# Patient Record
Sex: MALE | URBAN - NONMETROPOLITAN AREA
[De-identification: names, ages, dates, MRNs, and addresses within clinical notes are randomized per-mention and may not be internally consistent; named-entity substitution may affect disease eponyms.]

---

## 2023-07-10 RX ORDER — FLUTICASONE PROPIONATE 100 UG/1
POWDER, METERED RESPIRATORY (INHALATION)
COMMUNITY

## 2023-07-10 RX ORDER — ACETAMINOPHEN 160 MG
TABLET,DISINTEGRATING ORAL
COMMUNITY

## 2023-07-10 RX ORDER — VENLAFAXINE 75 MG/1
75 TABLET ORAL 3 TIMES DAILY
COMMUNITY

## 2023-07-10 RX ORDER — ALBUTEROL SULFATE 90 UG/1
2 AEROSOL, METERED RESPIRATORY (INHALATION) EVERY 6 HOURS PRN
COMMUNITY

## 2023-07-10 RX ORDER — LISINOPRIL 20 MG/1
20 TABLET ORAL DAILY
COMMUNITY

## 2023-07-10 RX ORDER — ASPIRIN 325 MG
TABLET, DELAYED RELEASE (ENTERIC COATED) ORAL DAILY
COMMUNITY

## 2023-07-10 RX ORDER — CLOPIDOGREL BISULFATE 75 MG/1
75 TABLET ORAL DAILY
COMMUNITY

## 2023-07-10 RX ORDER — OMEPRAZOLE 20 MG/1
20 CAPSULE, DELAYED RELEASE ORAL DAILY
COMMUNITY

## 2023-07-10 RX ORDER — ATORVASTATIN CALCIUM 40 MG/1
40 TABLET, FILM COATED ORAL DAILY
COMMUNITY

## 2023-07-10 RX ORDER — VERAPAMIL HYDROCHLORIDE 240 MG/1
240 CAPSULE, EXTENDED RELEASE ORAL NIGHTLY
COMMUNITY

## 2023-07-10 RX ORDER — TAMSULOSIN HYDROCHLORIDE 0.4 MG/1
0.4 CAPSULE ORAL DAILY
COMMUNITY

## 2023-07-10 RX ORDER — LORAZEPAM 1 MG/1
1 TABLET ORAL EVERY 6 HOURS PRN
COMMUNITY

## 2023-07-10 RX ORDER — PIOGLITAZONEHYDROCHLORIDE 30 MG/1
30 TABLET ORAL DAILY
COMMUNITY

## 2023-07-10 RX ORDER — NAPROXEN 500 MG/1
500 TABLET ORAL 2 TIMES DAILY WITH MEALS
COMMUNITY
End: 2023-07-11

## 2023-07-11 ENCOUNTER — OFFICE VISIT (OUTPATIENT)
Dept: PULMONOLOGY | Age: 53
End: 2023-07-11
Payer: COMMERCIAL

## 2023-07-11 VITALS
TEMPERATURE: 97.8 F | HEART RATE: 95 BPM | SYSTOLIC BLOOD PRESSURE: 118 MMHG | BODY MASS INDEX: 42.66 KG/M2 | HEIGHT: 72 IN | OXYGEN SATURATION: 98 % | DIASTOLIC BLOOD PRESSURE: 64 MMHG | WEIGHT: 315 LBS

## 2023-07-11 DIAGNOSIS — Z87.891 PERSONAL HISTORY OF TOBACCO USE, PRESENTING HAZARDS TO HEALTH: ICD-10-CM

## 2023-07-11 DIAGNOSIS — J44.9 CHRONIC OBSTRUCTIVE PULMONARY DISEASE, UNSPECIFIED COPD TYPE (HCC): Primary | ICD-10-CM

## 2023-07-11 DIAGNOSIS — R07.2 SUBSTERNAL CHEST PAIN: ICD-10-CM

## 2023-07-11 DIAGNOSIS — E88.01 AAT (ALPHA-1-ANTITRYPSIN) DEFICIENCY (HCC): ICD-10-CM

## 2023-07-11 DIAGNOSIS — R06.09 DYSPNEA ON EXERTION: ICD-10-CM

## 2023-07-11 DIAGNOSIS — G47.30 SLEEP APNEA, UNSPECIFIED TYPE: ICD-10-CM

## 2023-07-11 PROCEDURE — 3017F COLORECTAL CA SCREEN DOC REV: CPT | Performed by: INTERNAL MEDICINE

## 2023-07-11 PROCEDURE — G8428 CUR MEDS NOT DOCUMENT: HCPCS | Performed by: INTERNAL MEDICINE

## 2023-07-11 PROCEDURE — 4004F PT TOBACCO SCREEN RCVD TLK: CPT | Performed by: INTERNAL MEDICINE

## 2023-07-11 PROCEDURE — 99204 OFFICE O/P NEW MOD 45 MIN: CPT | Performed by: INTERNAL MEDICINE

## 2023-07-11 PROCEDURE — 3023F SPIROM DOC REV: CPT | Performed by: INTERNAL MEDICINE

## 2023-07-11 PROCEDURE — G8417 CALC BMI ABV UP PARAM F/U: HCPCS | Performed by: INTERNAL MEDICINE

## 2023-07-11 RX ORDER — PANTOPRAZOLE SODIUM 20 MG/1
20 TABLET, DELAYED RELEASE ORAL DAILY
COMMUNITY

## 2023-07-11 NOTE — PROGRESS NOTES
[place] [facility]  Pulmonary medicine clinic initial consult note. Patient: Sherryle Elk  : 1970      Chief complaint/[place]: Sherryle Elk is a 50 y.o. old male came for further evaluation regarding his COPD and shortness of breath with referral from Julia Cheng MD.    He is having shortness of breath:Yes  Onset: Gradual , when active  Duration:couple minutes. Diurnal variation:  None. Worse  with activity  Functional status prior to beginning of symptoms: Distance he can walk on level ground: 400 feet, as long as he goes at a slow pace  Current functional capacity on level ground: Distance he can walk on level ground: 400 feet. He can climb steps: Yes  Flights of steps he can climb: 1  He gives a history of orthopnea:No  He gives a history of paroxysmal nocturnal dyspnea:No, states he did have difficulty breathing with a CPAP      He is having cough: Yes  Duration of cough: for couple seconds in  duration multiple times a day. His cough is associated with sputum production: Yes, sometimes   The sputum color: clear, sometimes white  Hemoptysis:No  Diurnal variation: None. Worse in the morning  Relieving factors: No  Aggravating factors: No    He is having chest pain:yes, sometimes when he mows his dads lawn  Duration:Days:  Onset:gradual.  Location:predominantly on substernal  Nature/Quality:dull  Assessment:intermittent, with activity. Radiation:nonradiating  Scale:1/10  Relation ship to breathing: no pain with inspiration.     He is currently using any oxygen supplementation at rest, exercise or during sleep/at night time: No    He was ever diagnosed with following pulmonary diseases:  Asthma: Yes   Pulmonary fibrosis:NO  Sarcoidosis:NO  Pulmonary embolism: NO   History of DVT in the past: NO    Pulmonary hypertension:NO  Pleural effusion/s in the past:NO    He ever diagnosed with connective tissue diseases including Systemic

## 2023-07-11 NOTE — PATIENT INSTRUCTIONS
Recommendations/Plan:  -Ordered x-ray of chest PA and lateral views to evaluate lung fields due to history of tobacco smoking, follow-up in 1 month to review results  -Ordered 2D echo to assess cardiac function, correlation with symptoms and history of COPD   -Ordered full PFT with bronchodilator, follow-up in 1 month to review results  -Will send [id number] swab today and to be followed at next clinic visit.   -He was advised to continue to use albuterol HFA 2 puffs every 6 hourly as needed.  -Patient advised to continue current inhalers and keep good compliance with treatment. - Patient educated to update his pneumococcal vaccine with family physician and take influenza vaccine in coming season with out fail. Patient verbalizes understanding.  -Sherryle Elk educated about environmental safety precautions he need to practice to prevent exacerbation ofhis Asthma. Sherryle Elk verbalizes understanding.   -Schedule patient for Cardiology consult with Dr. Ana Smith MD as soon as possible for further evaluation of substernal chest pain with associated exertional dyspnea to evaluate for cardiac etiology of dyspnea and chest pain. - He was educated and demonstrated in my office how to use inhalers.   -Sherryle Elk advised to continue prescribed inhalers and keep good compliance. - Patient educated to update his pneumococcal vaccine with family physician and take influenza vaccine in coming season with out fail.   - Schedule patient for follow up with my clinic in 1 month with the planned chest x-ray PA and lateral views, full PFTs, alpha-1 antitrypsin and echocardiogram report.  He was advised to make early appointment if needed.  -Please schedule patient for follow up at [address] [place] [address] sleep clinic with earliest available provider for further evaluation and management of sleep apnea with his old sleep study reports from [facility].  -I had a discussion with patient regarding other

## 2023-07-11 NOTE — PROGRESS NOTES
Neck Circumference - 19    Mallampati - 4    Lung Nodule Screening     [] Qualifies    [] Does not qualify   [] Declined    [] Completed

## 2023-07-18 NOTE — PROGRESS NOTES
[place] [facility]  Pulmonary medicine clinic follow up note. Patient: Nicolasa Pacheco  : 1970      Chief complaint/[place]: Nicolasa Pacheco is a 50 y.o. old male came for further evaluation regarding his COPD and shortness of breath after having chest x-ray PA and lateral views, full PFTs, alpha-1 antitrypsin and echocardiogram report. He was initially referred from Eliezer Basilio MD.    He was evaluated by Ms. Aletha Reyes CNP at the [address] [place] [address] sleep clinic regarding his sleep apnea on 2023. On today's questioning:  He denies cough with no expectoration. He still having exertional dyspnea  He denies hemoptysis. He denies fever or chills. He denies recent hospitalizations or emergency room visits. He is using his prescribed inhalers with excellent compliance. However, patient did not see any improvement in his shortness of breath with the Flovent therapy  He is using his rescue inhaler too frequently    He denies recent loss of weight or appetite changes. He denies recent decline in functional status. He is currently using following inhalers:  Flovent 100 mcg Diskus 1 INH twice daily  Albuterol HFA 2 puffs every 6 hourly as needed    He is currently using any oxygen supplementation at rest, exercise or during sleep/at night time: No    He ever diagnosed with COVID-19 infection in the past:Yes once, never hospitalized. He denies any history of Glucoma or urinary retention in the past      Social History:  Occupation:  He is current working: No  Type of profession: Previously a , working on getting  disability currently.                      Social History     Tobacco Use    Smoking status: Former     Packs/day: 2.00     Types: Cigarettes     Start date:      Quit date: 7/10/2023     Years since quittin.1    Smokeless tobacco: Never    Tobacco comments:     Quit smoking 4wks ago w/Chantix    Vaping Use    Vaping

## 2023-07-27 DIAGNOSIS — E88.01 AAT (ALPHA-1-ANTITRYPSIN) DEFICIENCY (HCC): ICD-10-CM

## 2023-08-08 ENCOUNTER — TELEPHONE (OUTPATIENT)
Dept: PULMONOLOGY | Age: 53
End: 2023-08-08

## 2023-08-08 NOTE — TELEPHONE ENCOUNTER
Called Mouna zambrano [facility] to notify of approval for echo and that approval letter was faxed.

## 2023-08-08 NOTE — TELEPHONE ENCOUNTER
Received phone call from Mouna at [facility] stating patient completed echo this morning and did not have a prior Mireya Haro approval before testing. Mouna asked if we could start a prior auth for this testing.

## 2023-08-08 NOTE — PROGRESS NOTES
year or past 5 years     Caffeine Intake  Moody Jackson does drink caffeine, usually about  3 cups coffee per day. Recently quit smoking     Employment History  Moody Jackson does not work, currently trying for disability , previously , . Family Sleep History  There are not family members with a history of sleep problems. Sleep Hx     Sleep symptoms:  Yes No  Additional Comments   Snoring [x] []    Witness Apneas [] [x]        Waking snorting/gasping [x] []     Nocturia [x]    []     Legs kicking at night [] [x]     AM Headaches [] [x]     Non-restorative sleep [x] []     Daytime sleepiness/fatigue [x] []     Daytime napping [] [x]     Drowsiness while driving [] [x]     Memory issues [x] []     Decreased concentration [x] []     Cataplexy [] [x]     Hypnogogic hallucinations [] [x]     Sleep paralysis [] [x]     Other [] [x]        Symptoms began with chronic fatigue many years ago      Previous evaluation and treatment has included-PSG/  PSG w/ titration       DOT/CDL - no  FAA/'s license - no     Previous Report(s) Reviewed: historical medical records, office notes and referral letter/letters      Download     PAP Download:   Pt not currently on PAP therapy    Past Medical hx     PMH:  Past Medical History:   Diagnosis Date    Alpha-1-antitrypsin deficiency ([facility])     Asthma     COPD (chronic obstructive pulmonary disease) ([facility])     Diabetes mellitus ([facility])     Hyperlipemia     RADHA (obstructive sleep apnea)      SURGICAL HISTORY:History reviewed. No pertinent surgical history.   SOCIAL HISTORY:  Social History     Tobacco Use    Smoking status: Former     Packs/day: 2.00     Types: Cigarettes     Start date:      Quit date: 7/10/2023     Years since quittin.0    Smokeless tobacco: Never    Tobacco comments:     Quit smoking 4wks ago w/Chantix    Vaping Use    Vaping Use: Never used   Substance Use Topics    Alcohol use: Not Currently    Drug use: Never     ALLERGIES:No Known

## 2023-08-08 NOTE — TELEPHONE ENCOUNTER
Received approval through [employer] for echo at [facility] on 8/8/23 (see media).   Authorization: [id number]  Valid through: 8/8/23-10/7/23

## 2023-08-09 ENCOUNTER — OFFICE VISIT (OUTPATIENT)
Dept: PULMONOLOGY | Age: 53
End: 2023-08-09
Payer: COMMERCIAL

## 2023-08-09 VITALS
WEIGHT: 315 LBS | TEMPERATURE: 97.8 F | DIASTOLIC BLOOD PRESSURE: 60 MMHG | HEIGHT: 72 IN | HEART RATE: 96 BPM | SYSTOLIC BLOOD PRESSURE: 126 MMHG | OXYGEN SATURATION: 97 % | BODY MASS INDEX: 42.66 KG/M2

## 2023-08-09 DIAGNOSIS — I10 ESSENTIAL HYPERTENSION: ICD-10-CM

## 2023-08-09 DIAGNOSIS — G47.30 SLEEP APNEA, UNSPECIFIED TYPE: Primary | ICD-10-CM

## 2023-08-09 DIAGNOSIS — R06.83 LOUD SNORING: ICD-10-CM

## 2023-08-09 DIAGNOSIS — E66.01 OBESITY, CLASS III, BMI 40-49.9 (MORBID OBESITY) (HCC): ICD-10-CM

## 2023-08-09 DIAGNOSIS — K21.9 GERD WITHOUT ESOPHAGITIS: ICD-10-CM

## 2023-08-09 DIAGNOSIS — Z86.73 HISTORY OF STROKE: ICD-10-CM

## 2023-08-09 DIAGNOSIS — R53.82 CHRONIC FATIGUE: ICD-10-CM

## 2023-08-09 PROBLEM — N40.1 BPH ASSOCIATED WITH NOCTURIA: Status: ACTIVE | Noted: 2017-07-20

## 2023-08-09 PROBLEM — R35.1 BPH ASSOCIATED WITH NOCTURIA: Status: ACTIVE | Noted: 2017-07-20

## 2023-08-09 PROBLEM — J45.909 REACTIVE AIRWAY DISEASE: Status: ACTIVE | Noted: 2023-08-09

## 2023-08-09 PROBLEM — F17.210 CIGARETTE NICOTINE DEPENDENCE WITHOUT COMPLICATION: Status: ACTIVE | Noted: 2023-08-09

## 2023-08-09 PROBLEM — E88.01 ALPHA-1-ANTITRYPSIN DEFICIENCY (HCC): Status: ACTIVE | Noted: 2020-06-03

## 2023-08-09 PROBLEM — J30.89 CHRONIC NON-SEASONAL ALLERGIC RHINITIS: Status: ACTIVE | Noted: 2023-08-09

## 2023-08-09 PROCEDURE — 3017F COLORECTAL CA SCREEN DOC REV: CPT | Performed by: NURSE PRACTITIONER

## 2023-08-09 PROCEDURE — G8427 DOCREV CUR MEDS BY ELIG CLIN: HCPCS | Performed by: NURSE PRACTITIONER

## 2023-08-09 PROCEDURE — 1036F TOBACCO NON-USER: CPT | Performed by: NURSE PRACTITIONER

## 2023-08-09 PROCEDURE — 99214 OFFICE O/P EST MOD 30 MIN: CPT | Performed by: NURSE PRACTITIONER

## 2023-08-09 PROCEDURE — 3078F DIAST BP <80 MM HG: CPT | Performed by: NURSE PRACTITIONER

## 2023-08-09 PROCEDURE — G8417 CALC BMI ABV UP PARAM F/U: HCPCS | Performed by: NURSE PRACTITIONER

## 2023-08-09 PROCEDURE — 3074F SYST BP LT 130 MM HG: CPT | Performed by: NURSE PRACTITIONER

## 2023-08-09 ASSESSMENT — ENCOUNTER SYMPTOMS
WHEEZING: 0
EYES NEGATIVE: 1
DIARRHEA: 0
COUGH: 0
VOMITING: 0
ABDOMINAL PAIN: 0
SHORTNESS OF BREATH: 1
NAUSEA: 0

## 2023-08-14 DIAGNOSIS — J44.9 CHRONIC OBSTRUCTIVE PULMONARY DISEASE, UNSPECIFIED COPD TYPE (HCC): ICD-10-CM

## 2023-08-16 DIAGNOSIS — J44.9 CHRONIC OBSTRUCTIVE PULMONARY DISEASE, UNSPECIFIED COPD TYPE (HCC): ICD-10-CM

## 2023-08-16 DIAGNOSIS — G47.30 SLEEP APNEA, UNSPECIFIED TYPE: ICD-10-CM

## 2023-08-17 ENCOUNTER — OFFICE VISIT (OUTPATIENT)
Dept: PULMONOLOGY | Age: 53
End: 2023-08-17
Payer: COMMERCIAL

## 2023-08-17 VITALS
OXYGEN SATURATION: 98 % | HEART RATE: 97 BPM | BODY MASS INDEX: 42.66 KG/M2 | HEIGHT: 72 IN | DIASTOLIC BLOOD PRESSURE: 68 MMHG | SYSTOLIC BLOOD PRESSURE: 122 MMHG | WEIGHT: 315 LBS | TEMPERATURE: 97.7 F

## 2023-08-17 DIAGNOSIS — J44.9 COPD, MILD (HCC): Primary | ICD-10-CM

## 2023-08-17 PROCEDURE — 3017F COLORECTAL CA SCREEN DOC REV: CPT | Performed by: INTERNAL MEDICINE

## 2023-08-17 PROCEDURE — 99214 OFFICE O/P EST MOD 30 MIN: CPT | Performed by: INTERNAL MEDICINE

## 2023-08-17 PROCEDURE — 3078F DIAST BP <80 MM HG: CPT | Performed by: INTERNAL MEDICINE

## 2023-08-17 PROCEDURE — 3074F SYST BP LT 130 MM HG: CPT | Performed by: INTERNAL MEDICINE

## 2023-08-17 PROCEDURE — 1036F TOBACCO NON-USER: CPT | Performed by: INTERNAL MEDICINE

## 2023-08-17 PROCEDURE — G8427 DOCREV CUR MEDS BY ELIG CLIN: HCPCS | Performed by: INTERNAL MEDICINE

## 2023-08-17 PROCEDURE — G8417 CALC BMI ABV UP PARAM F/U: HCPCS | Performed by: INTERNAL MEDICINE

## 2023-08-17 PROCEDURE — 3023F SPIROM DOC REV: CPT | Performed by: INTERNAL MEDICINE

## 2023-08-17 NOTE — PATIENT INSTRUCTIONS
Recommendations/Plan:  -We will discontinue Flovent discus 100 mcg 1 inhalation twice daily due to no improvement in his clinical symptoms.  -Start patient on Stiolto Respimat ( Tiotropium bromide and Olodaterol) 2.5mcg/2.5mcg per actuation 2 puffs once daily. He was detailed about mechanism of action of drug along with associated side effects. He agreed to take the risk and medication. He verbalizes understanding. Patient educated and demonstrated in my office how to use above inhaler. Patient verbalizes understanding.  -He was advised to continue to use albuterol HFA 2 puffs every 6 hourly as needed.  -Patient advised to continue current inhalers and keep good compliance with treatment. - Patient educated to update his pneumococcal vaccine with family physician and take influenza vaccine in coming season with out fail. Patient verbalizes understanding.  -Nicolasa Pacheco educated about environmental safety precautions he need to practice to prevent exacerbation ofhis Asthma. Nicolasa Pacheco verbalizes understanding.   -He was advised to keep his scheduled appointment for cardiology consult with Dr. Joe Gooden MD further evaluation of substernal chest pain, grade 1 diastolic dysfunction noted on his recent echocardiogram with associated exertional dyspnea to evaluate for cardiac etiology of dyspnea and chest pain.  -Will schedule patient for Pulmonary rehab consult as out patient as soon as possible for pulmonary rehab therapy for his COPD once cleared by his cardiologist Dr. Joe Gooden MD  - He was educated and demonstrated in my office how to use inhalers.   -Nicolasa Pacheco advised to continue prescribed inhalers and keep good compliance.  -Nicolasa Pacheco was advised  to keep his scheduled follow up at [address] [place] [address] for pulmonary disease) sleep clinic with Ms. Tierney Martin CNP for further evaluation and management of sleep apnea.   -Schedule patient for follow up with my clinic in 4months for

## 2023-08-22 ENCOUNTER — TELEPHONE (OUTPATIENT)
Dept: CARDIAC REHAB | Age: 53
End: 2023-08-22

## 2023-08-22 NOTE — TELEPHONE ENCOUNTER
PULMONARY REHABILITATION       Received order for Phase II Pulmonary Rehabilitation. Based on insurance qualifications, patient does not qualify for Pulmonary Rehab for COPD based on the FEV1/FVC and FEV1% results on his PFT. Qualifications: FEV1/FVC actual <70 AND FEV1% <80. Roma Stewart 's FEV1/FVC actual is 79 FEV1% is 80      Thank you for your referral to [facility].

## 2023-11-15 NOTE — PROGRESS NOTES
[place] [facility] Pulmonary, Sleep and Critical Care Medicine  Pulmonary medicine clinic follow up note. Patient: Evangelina Billingsley  : 1970      Chief complaint/[place]: Evangelina Billingsley is a 48 y.o. old male came for further evaluation regarding his mild COPD and mild persistent bronchial asthma. He was initially referred from Kylah Darby MD.    He was evaluated by Dr. Luis Daniel Nuñez MD from cardiology service. He was diagnosed with paroxysmal atrial fibrillation. He is currently on treatment with Eliquis 5 mg p.o. twice daily along with flecainide 100 mg p.o. twice daily. He follows with Ms. Royal Alfa CNP regarding his mild obstructive sleep apnea. He is currently not on any treatment. He is interested in going on treatment with inspire device if he still found to have sleep apnea. On today's questioning:  He denies cough or expectoration. He denies hemoptysis. He denies fever or chills. He denies recent hospitalizations or emergency room visits. He is using his prescribed inhalers with good compliance. He is using rescue inhaler/nebs rarely. He denies recent loss of weight or appetite changes. He denies recent decline in functional status. He is currently using following inhalers:  Trelegy 1 puff 4 AM flexion daily  Albuterol HFA 2 puffs every 6 hourly as needed    He is currently using any oxygen supplementation at rest, exercise or during sleep/at night time: No    He ever diagnosed with COVID-19 infection in the past:Yes once, never hospitalized. He denies any history of Glucoma or urinary retention in the past      Social History:  Occupation:  He is current working: No  Type of profession: Previously a , working on getting  disability currently.                      Social History     Tobacco Use    Smoking status: Former     Packs/day: 2     Types: Cigarettes     Start date:      Quit date: 7/10/2023     Years since quittin.4

## 2023-12-14 ENCOUNTER — TELEPHONE (OUTPATIENT)
Dept: PULMONOLOGY | Age: 53
End: 2023-12-14

## 2023-12-14 ENCOUNTER — OFFICE VISIT (OUTPATIENT)
Dept: PULMONOLOGY | Age: 53
End: 2023-12-14
Payer: COMMERCIAL

## 2023-12-14 VITALS
HEART RATE: 89 BPM | BODY MASS INDEX: 42.66 KG/M2 | DIASTOLIC BLOOD PRESSURE: 82 MMHG | SYSTOLIC BLOOD PRESSURE: 130 MMHG | TEMPERATURE: 98.3 F | OXYGEN SATURATION: 97 % | WEIGHT: 315 LBS | HEIGHT: 72 IN

## 2023-12-14 DIAGNOSIS — G47.30 SLEEP APNEA, UNSPECIFIED TYPE: ICD-10-CM

## 2023-12-14 DIAGNOSIS — I10 ESSENTIAL HYPERTENSION: ICD-10-CM

## 2023-12-14 DIAGNOSIS — J44.9 COPD, MILD (HCC): ICD-10-CM

## 2023-12-14 DIAGNOSIS — Z87.891 PERSONAL HISTORY OF TOBACCO USE, PRESENTING HAZARDS TO HEALTH: Primary | ICD-10-CM

## 2023-12-14 DIAGNOSIS — I48.0 PAROXYSMAL A-FIB (HCC): ICD-10-CM

## 2023-12-14 PROCEDURE — 99214 OFFICE O/P EST MOD 30 MIN: CPT | Performed by: INTERNAL MEDICINE

## 2023-12-14 PROCEDURE — 3023F SPIROM DOC REV: CPT | Performed by: INTERNAL MEDICINE

## 2023-12-14 PROCEDURE — 3075F SYST BP GE 130 - 139MM HG: CPT | Performed by: INTERNAL MEDICINE

## 2023-12-14 PROCEDURE — G8427 DOCREV CUR MEDS BY ELIG CLIN: HCPCS | Performed by: INTERNAL MEDICINE

## 2023-12-14 PROCEDURE — G8417 CALC BMI ABV UP PARAM F/U: HCPCS | Performed by: INTERNAL MEDICINE

## 2023-12-14 PROCEDURE — G8484 FLU IMMUNIZE NO ADMIN: HCPCS | Performed by: INTERNAL MEDICINE

## 2023-12-14 PROCEDURE — 1036F TOBACCO NON-USER: CPT | Performed by: INTERNAL MEDICINE

## 2023-12-14 PROCEDURE — 3079F DIAST BP 80-89 MM HG: CPT | Performed by: INTERNAL MEDICINE

## 2023-12-14 PROCEDURE — 3017F COLORECTAL CA SCREEN DOC REV: CPT | Performed by: INTERNAL MEDICINE

## 2023-12-14 RX ORDER — FLECAINIDE ACETATE 100 MG/1
100 TABLET ORAL 2 TIMES DAILY
COMMUNITY

## 2023-12-14 NOTE — TELEPHONE ENCOUNTER
Spoke with Mouna from [employer] and informed her I received approval for CT scheduled next week with them. Informed Mouna I would fax authorization to her.

## 2023-12-14 NOTE — TELEPHONE ENCOUNTER
[facility] prior [facility] lung screen scheduled for 12/22/23 at [address] [place] [address] was approved by [employer] (see media).   Authorization number: [id number]  Valid through: 12/14/23-2/12/24

## 2023-12-14 NOTE — TELEPHONE ENCOUNTER
Morelia from [facility] called and asked if I could start prior authorization for CT lung screen scheduled for 12/22/23 at [facility] that was ordered today (12/14/23) by Dr Rita Hayden.

## 2023-12-14 NOTE — PROGRESS NOTES
Neck Circumference -   18.5  Mallampati - 3    Lung Nodule Screening     [] Qualifies    [] Does not qualify   [] Declined    [] Completed

## 2023-12-14 NOTE — PATIENT INSTRUCTIONS
pulmonary disease) sleep clinic with Ms. Tierney Martin CNP for further evaluation and management of sleep apnea. -Schedule patient for low dose CT scan of chest with out IV contrast as recommended by the  U.S. Preventive Services Task Force and the NCCN for lung Cancer Screening as soon as possible.   -Schedule patient for follow up with my clinic in 1month with recommended test I.e low dose CT chest with out contrast. Patient advised to make early appointment if needed.   -Rosita Armendariz educated about my impression and plan. He verbalizes understanding.

## 2023-12-22 DIAGNOSIS — Z87.891 PERSONAL HISTORY OF TOBACCO USE, PRESENTING HAZARDS TO HEALTH: ICD-10-CM

## 2023-12-22 DIAGNOSIS — J44.9 COPD, MILD (HCC): ICD-10-CM

## 2023-12-27 ENCOUNTER — OFFICE VISIT (OUTPATIENT)
Dept: PULMONOLOGY | Age: 53
End: 2023-12-27
Payer: COMMERCIAL

## 2023-12-27 VITALS
HEART RATE: 93 BPM | SYSTOLIC BLOOD PRESSURE: 124 MMHG | OXYGEN SATURATION: 95 % | DIASTOLIC BLOOD PRESSURE: 74 MMHG | BODY MASS INDEX: 42.66 KG/M2 | WEIGHT: 315 LBS | TEMPERATURE: 97.8 F | HEIGHT: 72 IN

## 2023-12-27 DIAGNOSIS — F17.219 CIGARETTE NICOTINE DEPENDENCE WITH NICOTINE-INDUCED DISORDER: ICD-10-CM

## 2023-12-27 DIAGNOSIS — J44.9 COPD, MILD (HCC): ICD-10-CM

## 2023-12-27 DIAGNOSIS — J44.1 COPD EXACERBATION (HCC): Primary | ICD-10-CM

## 2023-12-27 DIAGNOSIS — E66.01 OBESITY, CLASS III, BMI 40-49.9 (MORBID OBESITY) (HCC): ICD-10-CM

## 2023-12-27 DIAGNOSIS — I48.0 PAROXYSMAL A-FIB (HCC): ICD-10-CM

## 2023-12-27 PROCEDURE — G8427 DOCREV CUR MEDS BY ELIG CLIN: HCPCS | Performed by: NURSE PRACTITIONER

## 2023-12-27 PROCEDURE — 3074F SYST BP LT 130 MM HG: CPT | Performed by: NURSE PRACTITIONER

## 2023-12-27 PROCEDURE — 99214 OFFICE O/P EST MOD 30 MIN: CPT | Performed by: NURSE PRACTITIONER

## 2023-12-27 PROCEDURE — G8482 FLU IMMUNIZE ORDER/ADMIN: HCPCS | Performed by: NURSE PRACTITIONER

## 2023-12-27 PROCEDURE — G0296 VISIT TO DETERM LDCT ELIG: HCPCS | Performed by: NURSE PRACTITIONER

## 2023-12-27 PROCEDURE — 3017F COLORECTAL CA SCREEN DOC REV: CPT | Performed by: NURSE PRACTITIONER

## 2023-12-27 PROCEDURE — G8417 CALC BMI ABV UP PARAM F/U: HCPCS | Performed by: NURSE PRACTITIONER

## 2023-12-27 PROCEDURE — 3023F SPIROM DOC REV: CPT | Performed by: NURSE PRACTITIONER

## 2023-12-27 PROCEDURE — 1036F TOBACCO NON-USER: CPT | Performed by: NURSE PRACTITIONER

## 2023-12-27 PROCEDURE — 3078F DIAST BP <80 MM HG: CPT | Performed by: NURSE PRACTITIONER

## 2023-12-27 RX ORDER — PREDNISONE 20 MG/1
40 TABLET ORAL DAILY
Qty: 10 TABLET | Refills: 0 | Status: SHIPPED | OUTPATIENT
Start: 2023-12-27 | End: 2024-01-01

## 2023-12-27 NOTE — PROGRESS NOTES
[facility]     Patient: Tierney Bose, 48 y.o.   : 2021    Pt of Dr. Candido Viramontes   Patient presents with    COPD     4mo COPD f/u w/CT Chest 23 [facility]. Here to discuss results. Is accompanied by his son, Arden Maddox.        HPI       Patient presents for  COPD follow up with annual LDCT   C/o lungs feeling tight x 2 days. Was around lots of people over [place], few were sick with URI symptoms   Not compliant with inhalers. Has trelegy ellipta 100/62.5/25 mcg , rarely using   Has albuterol sulfate HFA - rarely uses. Has not used inhalers since having pulmonary symptoms   Scheduled for in lab PSG in 2 nights. Started smoking again. PFT w/ in 2 years? Yes : 23    6 min walk?  No   A1A : MM       SOCIAL HISTORY:  Social History     Tobacco Use    Smoking status: Former     Current packs/day: 0.00     Average packs/day: 2.0 packs/day for 32.5 years (65.0 ttl pk-yrs)     Types: Cigarettes     Start date: 14     Quit date: 7/10/2023     Years since quittin.4    Smokeless tobacco: Never    Tobacco comments:     Quit smoking 7/10/2023 but has since started again 23   Vaping Use    Vaping Use: Never used   Substance Use Topics    Alcohol use: Not Currently    Drug use: Never       CURRENT MEDICATIONS:  Current Outpatient Medications   Medication Sig Dispense Refill    predniSONE (DELTASONE) 20 MG tablet Take 2 tablets by mouth daily for 5 days 10 tablet 0    flecainide (TAMBOCOR) 100 MG tablet Take 1 tablet by mouth 2 times daily      apixaban (ELIQUIS) 5 MG TABS tablet Take by mouth 2 times daily      Fluticasone-Umeclidin-Vilant (TRELEGY ELLIPTA IN) Inhale into the lungs      VARENICLINE TARTRATE PO Take by mouth      Empagliflozin (JARDIANCE PO) Take by mouth      pantoprazole (PROTONIX) 20 MG tablet Take 2 tablets by mouth daily      albuterol sulfate HFA (PROVENTIL;VENTOLIN;PROAIR) 108 (90

## 2024-01-08 DIAGNOSIS — J44.9 COPD, MILD (HCC): ICD-10-CM

## 2024-01-08 DIAGNOSIS — G47.30 SLEEP APNEA, UNSPECIFIED TYPE: ICD-10-CM

## 2024-01-08 DIAGNOSIS — I48.0 PAROXYSMAL A-FIB (HCC): ICD-10-CM

## 2024-01-08 DIAGNOSIS — Z87.891 PERSONAL HISTORY OF TOBACCO USE, PRESENTING HAZARDS TO HEALTH: ICD-10-CM

## 2024-01-08 DIAGNOSIS — I10 ESSENTIAL HYPERTENSION: ICD-10-CM

## 2024-01-18 ENCOUNTER — OFFICE VISIT (OUTPATIENT)
Dept: PULMONOLOGY | Age: 54
End: 2024-01-18
Payer: COMMERCIAL

## 2024-01-18 VITALS
BODY MASS INDEX: 42.66 KG/M2 | OXYGEN SATURATION: 98 % | DIASTOLIC BLOOD PRESSURE: 68 MMHG | SYSTOLIC BLOOD PRESSURE: 124 MMHG | WEIGHT: 315 LBS | TEMPERATURE: 97.2 F | HEIGHT: 72 IN | HEART RATE: 82 BPM

## 2024-01-18 DIAGNOSIS — G47.33 OSA (OBSTRUCTIVE SLEEP APNEA): Primary | ICD-10-CM

## 2024-01-18 PROCEDURE — G8417 CALC BMI ABV UP PARAM F/U: HCPCS | Performed by: INTERNAL MEDICINE

## 2024-01-18 PROCEDURE — 1036F TOBACCO NON-USER: CPT | Performed by: INTERNAL MEDICINE

## 2024-01-18 PROCEDURE — G8427 DOCREV CUR MEDS BY ELIG CLIN: HCPCS | Performed by: INTERNAL MEDICINE

## 2024-01-18 PROCEDURE — 3074F SYST BP LT 130 MM HG: CPT | Performed by: INTERNAL MEDICINE

## 2024-01-18 PROCEDURE — 3017F COLORECTAL CA SCREEN DOC REV: CPT | Performed by: INTERNAL MEDICINE

## 2024-01-18 PROCEDURE — 3078F DIAST BP <80 MM HG: CPT | Performed by: INTERNAL MEDICINE

## 2024-01-18 PROCEDURE — G8482 FLU IMMUNIZE ORDER/ADMIN: HCPCS | Performed by: INTERNAL MEDICINE

## 2024-01-18 PROCEDURE — 99213 OFFICE O/P EST LOW 20 MIN: CPT | Performed by: INTERNAL MEDICINE

## 2024-01-18 NOTE — PROGRESS NOTES
Chief Complaint: psg results     Mallampati airway Class:4  Neck Circumference:. 19 Inches    [place] sleepiness score 1/18/24: 1  Sleep apnea quality of life questionnaire:.56    
obstructive sleep apnea with apnea hypopneas of 5.1.  -I had a detailed discussion with patient regarding avialable treatment options for his sleep disorder breathing including but not limited to going back on CPAP therapy Vs other available surgical options including Uvulopalatopharyngoplasty, maxillomandibular ostomy and tracheostomy as last option. At the end of discussion, he decided to not to go for any treatment for his mild obstructive sleep apnea. He is aware of the consequences of his decision including chance of increased risk for cardiovascular and cerebrovascular events and morbidity including death.  -Patient is not a candidate for dental appliance placement as a treatment for his mild obstructive sleep apnea.  He lost all his teeth in the upper jaw.  He is currently using a denture in the upper jaw.  -Eloy Ramírez was advised to keep his scheduled follow up at [facility] ( [facility] for pulmonary disease[facility] Pulmonary clinic with Ms. Tal Martin CNP for further evaluation and management of his COPD.  -He was advised to loose weight by controlling diet and doing exercise once cleared by his Cardiologist   -He was advised to continue to practice good sleep hygiene practices.  -Schedule patient for follow up with my clinic on PRN/As needed basis for sleep related issues. Patient to follow with his family physician closely.   -Eloy Ramírez was educated about my impression and plan. He verbalizes understanding.      -I personally reviewed updated the Past medical hx, Past surgical hx,Social hx, Family hx, Medications, Allergies in the discrete data section of the patient chart along with labs, Pulmonary medicine,Sleep medicine related, Pathological, Microbiological and Radiological investigations.

## 2024-01-18 NOTE — PATIENT INSTRUCTIONS
Recommendations/Plan:  -Patient did not qualify for inspire referral to treat his mild obstructive sleep apnea with apnea hypopneas of 5.1.  -I had a detailed discussion with patient regarding avialable treatment options for his sleep disorder breathing including but not limited to going back on CPAP therapy Vs other available surgical options including Uvulopalatopharyngoplasty, maxillomandibular ostomy and tracheostomy as last option. At the end of discussion, he decided to not to go for any treatment for his mild obstructive sleep apnea. He is aware of the consequences of his decision including chance of increased risk for cardiovascular and cerebrovascular events and morbidity including death.  -Patient is not a candidate for dental appliance placement as a treatment for his mild obstructive sleep apnea.  He lost all his teeth in the upper jaw.  He is currently using a denture in the upper jaw.  -Eloy Ramírez was advised to keep his scheduled follow up at [facility] ( [facility] for pulmonary disease) Pulmonary clinic with Ms. Tal Martin CNP for further evaluation and management of his COPD.  -He was advised to loose weight by controlling diet and doing exercise once cleared by his Cardiologist   -He was advised to continue to practice good sleep hygiene practices.  -Schedule patient for follow up with my clinic on PRN/As needed basis for sleep related issues. Patient to follow with his family physician closely.   -Eloy Ramírez was educated about my impression and plan. He verbalizes understanding.

## 2024-12-12 ENCOUNTER — TELEPHONE (OUTPATIENT)
Dept: PULMONOLOGY | Age: 54
End: 2024-12-12

## 2024-12-12 NOTE — TELEPHONE ENCOUNTER
Attempted to start prior authorization through [employer] for CT lung screen scheduled for 12/23/24 at [facility].     My request on [employer] portal got flagged for duplicate request for CT lung screen at [facility].  Request submitted on 12/6/24.    Phoned Mandie at [facility] to inform that an authorization request was submitted outside of [facility] for CT lung screen.  Mandie stated one of [place] staff members submitted request on 12/6/24.  Informed Mandie from my end the request was approved but I could not see authorization number or valid dates.  Mandie stated she would update patient as authorized for CT on 12/23/24.

## 2025-01-08 ENCOUNTER — HOSPITAL ENCOUNTER (OUTPATIENT)
Dept: GENERAL RADIOLOGY | Age: 55
Discharge: HOME | End: 2025-01-08

## 2025-01-08 ENCOUNTER — OFFICE VISIT (OUTPATIENT)
Dept: PULMONOLOGY | Age: 55
End: 2025-01-08

## 2025-01-08 VITALS
TEMPERATURE: 97.4 F | WEIGHT: 295 LBS | HEART RATE: 103 BPM | SYSTOLIC BLOOD PRESSURE: 134 MMHG | HEIGHT: 72 IN | DIASTOLIC BLOOD PRESSURE: 76 MMHG | OXYGEN SATURATION: 94 % | BODY MASS INDEX: 39.96 KG/M2

## 2025-01-08 DIAGNOSIS — F17.210 CIGARETTE NICOTINE DEPENDENCE, UNCOMPLICATED: ICD-10-CM

## 2025-01-08 DIAGNOSIS — J44.9 COPD, MILD (HCC): Primary | ICD-10-CM

## 2025-01-08 DIAGNOSIS — Z91.148 NON COMPLIANCE W MEDICATION REGIMEN: ICD-10-CM

## 2025-01-08 DIAGNOSIS — Z00.6 EXAMINATION FOR NORMAL COMPARISON FOR CLINICAL RESEARCH: ICD-10-CM

## 2025-01-08 RX ORDER — INSULIN GLARGINE 100 [IU]/ML
30 INJECTION, SOLUTION SUBCUTANEOUS
COMMUNITY

## 2025-01-08 RX ORDER — VARENICLINE TARTRATE 0.5 MG/1
0.5 TABLET, FILM COATED ORAL 2 TIMES DAILY
Qty: 60 TABLET | Refills: 2 | Status: SHIPPED | OUTPATIENT
Start: 2025-01-08 | End: 2025-01-08

## 2025-01-08 RX ORDER — METOPROLOL SUCCINATE 25 MG/1
TABLET, EXTENDED RELEASE ORAL
COMMUNITY
Start: 2024-12-10

## 2025-01-08 RX ORDER — VARENICLINE TARTRATE 0.5 MG/1
.5-1 TABLET, FILM COATED ORAL SEE ADMIN INSTRUCTIONS
Qty: 57 TABLET | Refills: 0 | Status: SHIPPED | OUTPATIENT
Start: 2025-01-08

## 2025-01-08 RX ORDER — PREDNISONE 20 MG/1
20 TABLET ORAL DAILY
COMMUNITY

## 2025-01-08 ASSESSMENT — ENCOUNTER SYMPTOMS
COUGH: 1
VOMITING: 0
DIARRHEA: 0
ABDOMINAL PAIN: 0
WHEEZING: 0
NAUSEA: 0
EYES NEGATIVE: 1
SHORTNESS OF BREATH: 0

## 2025-01-08 NOTE — PROGRESS NOTES
[place] for Pulmonary Medicine and Sleep Medicine     Patient: SHERRY LEBLANC, 54 y.o.   : 1970    Pt of Dr. Sears     Subjective     Chief Complaint   Patient presents with    Follow-up     1 year COPD follow up with recent chest XR 24.        HPI      Patient presents for 1 year COPD  follow up   More stress lately, smoking 1.5 PPD, willing to try Chantix, had benefit In past     Has trelegy ellipta 100/62.5/25 mcg , rarely using -insurance does not cover, uses samples and tries to make these last.   Currently has [employer] insurance , will be switching to Medicare in Feb   Has albuterol sulfate HFA - rarely uses.     AFIB - pt reports currently controlled.   Chronic daily cough - clear phlegm, stable. Rarely has dyspnea or wheezing     PFT w/ in 2 years? Yes : 23  6 min walk? No   A1A : MM     Immunization History   Administered Date(s) Administered    COVID-19, PFIZER PURPLE top, DILUTE for use, (age 12 y+), 30mcg/0.3mL 2021, 06/15/2021    INFLUENZA, INTRADERMAL, QUADRIVALENT, PRESERVATIVE FREE 10/23/2017    Influenza Virus Vaccine 10/16/2014, 10/28/2015, 10/21/2019, 10/19/2020    Influenza, AFLURIA (age 3 y+), FLUZONE, (age 6 mo+), Quadv MDV, 0.5mL 2019    Influenza, FLUARIX, FLULAVAL, FLUZONE (age 6 mo+) and AFLURIA, (age 3 y+), Quadv PF, 0.5mL 10/04/2018, 2022    Influenza, FLUBLOK, (age 18 y+), Quadv PF, 0.5mL 10/21/2019, 10/19/2020, 2021, 10/08/2023    Pneumococcal Conjugate Vaccine 10/23/2017    Pneumococcal, PCV-13, PREVNAR 13, (age 6w+), IM, 0.5mL 10/15/2014, 10/15/2014    Pneumococcal, PCV20, PREVNAR 20, (age 6w+), IM, 0.5mL 2022    Pneumococcal, PPSV23, PNEUMOVAX 23, (age 2y+), SC/IM, 0.5mL 10/23/2017    Zoster Recombinant (Shingrix) 2022, 2022       SOCIAL HISTORY:  Social History     Tobacco Use    Smoking status: Every Day     Current packs/day: 0.00     Average packs/day: 2.0 packs/day for 32.5 years (65.0 ttl pk-yrs)

## 2025-01-17 ENCOUNTER — HOSPITAL ENCOUNTER (OUTPATIENT)
Dept: CT IMAGING | Age: 55
Discharge: HOME | End: 2025-01-17
Attending: RADIOLOGY

## 2025-01-17 DIAGNOSIS — Z00.6 EXAMINATION FOR NORMAL COMPARISON FOR CLINICAL RESEARCH: ICD-10-CM

## 2025-01-20 DIAGNOSIS — J44.9 COPD, MILD (HCC): ICD-10-CM

## 2025-01-20 DIAGNOSIS — F17.210 CIGARETTE NICOTINE DEPENDENCE, UNCOMPLICATED: ICD-10-CM

## 2025-02-12 ENCOUNTER — OFFICE VISIT (OUTPATIENT)
Dept: PULMONOLOGY | Age: 55
End: 2025-02-12
Payer: MEDICARE

## 2025-02-12 VITALS
HEART RATE: 95 BPM | OXYGEN SATURATION: 95 % | WEIGHT: 296.8 LBS | DIASTOLIC BLOOD PRESSURE: 76 MMHG | BODY MASS INDEX: 40.2 KG/M2 | TEMPERATURE: 97.5 F | SYSTOLIC BLOOD PRESSURE: 124 MMHG | HEIGHT: 72 IN

## 2025-02-12 DIAGNOSIS — J44.9 COPD, MILD (HCC): Primary | ICD-10-CM

## 2025-02-12 DIAGNOSIS — F17.210 CIGARETTE NICOTINE DEPENDENCE, UNCOMPLICATED: ICD-10-CM

## 2025-02-12 DIAGNOSIS — Z91.89 PULMONARY NODULE LESS THAN 6 MM IN DIAMETER WITH LOW RISK FOR MALIGNANT NEOPLASM: ICD-10-CM

## 2025-02-12 DIAGNOSIS — R91.1 PULMONARY NODULE LESS THAN 6 MM IN DIAMETER WITH LOW RISK FOR MALIGNANT NEOPLASM: ICD-10-CM

## 2025-02-12 DIAGNOSIS — J20.9 ACUTE BRONCHITIS, UNSPECIFIED ORGANISM: ICD-10-CM

## 2025-02-12 PROCEDURE — 99214 OFFICE O/P EST MOD 30 MIN: CPT | Performed by: NURSE PRACTITIONER

## 2025-02-12 PROCEDURE — 4004F PT TOBACCO SCREEN RCVD TLK: CPT | Performed by: NURSE PRACTITIONER

## 2025-02-12 PROCEDURE — 3074F SYST BP LT 130 MM HG: CPT | Performed by: NURSE PRACTITIONER

## 2025-02-12 PROCEDURE — G8417 CALC BMI ABV UP PARAM F/U: HCPCS | Performed by: NURSE PRACTITIONER

## 2025-02-12 PROCEDURE — 3017F COLORECTAL CA SCREEN DOC REV: CPT | Performed by: NURSE PRACTITIONER

## 2025-02-12 PROCEDURE — G8427 DOCREV CUR MEDS BY ELIG CLIN: HCPCS | Performed by: NURSE PRACTITIONER

## 2025-02-12 PROCEDURE — 3078F DIAST BP <80 MM HG: CPT | Performed by: NURSE PRACTITIONER

## 2025-02-12 PROCEDURE — 3023F SPIROM DOC REV: CPT | Performed by: NURSE PRACTITIONER

## 2025-02-12 RX ORDER — PREDNISONE 10 MG/1
10 TABLET ORAL DAILY
Qty: 30 TABLET | Refills: 0 | Status: SHIPPED | OUTPATIENT
Start: 2025-02-12 | End: 2025-03-14

## 2025-02-12 RX ORDER — FLUTICASONE FUROATE, UMECLIDINIUM BROMIDE AND VILANTEROL TRIFENATATE 100; 62.5; 25 UG/1; UG/1; UG/1
1 POWDER RESPIRATORY (INHALATION) DAILY
Qty: 30 EACH | Refills: 11 | Status: SHIPPED | OUTPATIENT
Start: 2025-02-12

## 2025-02-12 RX ORDER — VARENICLINE TARTRATE 0.5 MG/1
.5-1 TABLET, FILM COATED ORAL SEE ADMIN INSTRUCTIONS
Qty: 57 TABLET | Refills: 0 | Status: SHIPPED | OUTPATIENT
Start: 2025-02-12

## 2025-02-12 ASSESSMENT — ENCOUNTER SYMPTOMS
CHEST TIGHTNESS: 1
SHORTNESS OF BREATH: 1

## 2025-02-12 NOTE — PROGRESS NOTES
[facility]     Patient: SHERRY LEBLANC, 54 y.o.   : 1970    Pt of Dr. Torrez     Subjective     Chief Complaint   Patient presents with    Follow-up     1 month COPD follow up with chest CT & PFT 25.        HPI      Patient presents for 1 month  COPD  follow up with CT lung screen and spirometry   Since last visit, insurance switched from [employer] to Medicare   He is having trouble getting his medications covered.  States most meds are needed PA's   Vancex was working but is out of it currently  Has slowed down smoking to 0.5 PPD  Last few days c/o increased chest tightness and PND     6 min walk? No   A1A : MM          Immunization History   Administered Date(s) Administered    COVID-19, PFIZER PURPLE top, DILUTE for use, (age 12 y+), 30mcg/0.3mL 2021, 06/15/2021    INFLUENZA, INTRADERMAL, QUADRIVALENT, PRESERVATIVE FREE 10/23/2017    Influenza Virus Vaccine 10/16/2014, 10/28/2015, 10/21/2019, 10/19/2020    Influenza, AFLURIA (age 3 y+), FLUZONE, (age 6 mo+), Quadv MDV, 0.5mL 2019    Influenza, FLUARIX, FLULAVAL, FLUZONE (age 6 mo+) and AFLURIA, (age 3 y+), Quadv PF, 0.5mL 10/04/2018, 2022    Influenza, FLUBLOK, (age 18 y+), Quadv PF, 0.5mL 10/21/2019, 10/19/2020, 2021, 10/08/2023    Pneumococcal Conjugate Vaccine 10/23/2017    Pneumococcal, PCV-13, PREVNAR 13, (age 6w+), IM, 0.5mL 10/15/2014, 10/15/2014    Pneumococcal, PCV20, PREVNAR 20, (age 6w+), IM, 0.5mL 2022    Pneumococcal, PPSV23, PNEUMOVAX 23, (age 2y+), SC/IM, 0.5mL 10/23/2017    Zoster Recombinant (Shingrix) 2022, 2022       SOCIAL HISTORY:  Social History     Tobacco Use    Smoking status: Every Day     Current packs/day: 0.00     Average packs/day: 2.0 packs/day for 32.5 years (65.0 ttl pk-yrs)     Types: Cigarettes     Start date:      Last attempt to quit: 7/10/2023     Years since quittin.5    Smokeless tobacco: Never